# Patient Record
Sex: MALE | Race: WHITE | NOT HISPANIC OR LATINO | URBAN - NONMETROPOLITAN AREA
[De-identification: names, ages, dates, MRNs, and addresses within clinical notes are randomized per-mention and may not be internally consistent; named-entity substitution may affect disease eponyms.]

---

## 2019-11-18 ENCOUNTER — IMPORTED ENCOUNTER (OUTPATIENT)
Dept: URBAN - NONMETROPOLITAN AREA CLINIC 1 | Facility: CLINIC | Age: 29
End: 2019-11-18

## 2019-11-18 PROBLEM — H52.223: Noted: 2019-11-18

## 2019-11-18 PROBLEM — H52.03: Noted: 2019-11-18

## 2019-11-18 PROBLEM — H10.423: Noted: 2019-11-18

## 2019-11-18 PROCEDURE — S0621 ROUTINE OPHTHALMOLOGICAL EXA: HCPCS

## 2019-11-18 NOTE — PATIENT DISCUSSION
Ocular Allergies OU -  discussed findings w/patient -  start Alaway up to BID OU -  monitor yearly or prn Compound Hyperopic Astigmatism -  discussed findings w/patient-  updated spectacle Rx issued-  monitor yearly or prn; 's Notes: MR 11/18/2019DFE 11/18/2019

## 2020-01-14 ENCOUNTER — IMPORTED ENCOUNTER (OUTPATIENT)
Dept: URBAN - NONMETROPOLITAN AREA CLINIC 1 | Facility: CLINIC | Age: 30
End: 2020-01-14

## 2020-01-14 PROBLEM — H52.03: Noted: 2020-01-14

## 2020-01-14 PROBLEM — H10.423: Noted: 2020-01-14

## 2020-01-14 PROBLEM — S05.01XA: Noted: 2020-01-14

## 2020-01-14 PROBLEM — H52.223: Noted: 2020-01-14

## 2020-01-14 PROBLEM — T15.01XA: Noted: 2020-01-14

## 2020-01-14 PROCEDURE — 99213 OFFICE O/P EST LOW 20 MIN: CPT

## 2020-01-14 NOTE — PATIENT DISCUSSION
Corneal Abrasion 2* FB OD-  discussed findings w/patient-  start Tobradex QID OD x 5 days then d/c-  monitor as scheduled or prn; 's Notes: MR 11/18/2019DFE 11/18/2019

## 2022-04-09 ASSESSMENT — TONOMETRY
OS_IOP_MMHG: 16
OD_IOP_MMHG: 15
OS_IOP_MMHG: 16
OD_IOP_MMHG: 15

## 2022-04-09 ASSESSMENT — VISUAL ACUITY
OD_CC: 20/40
OS_CC: 20/20
OS_SC: 20/20
OS_CC: 20/20
OU_CC: 20/20
OD_CC: 20/40-